# Patient Record
Sex: FEMALE | Race: WHITE | NOT HISPANIC OR LATINO
[De-identification: names, ages, dates, MRNs, and addresses within clinical notes are randomized per-mention and may not be internally consistent; named-entity substitution may affect disease eponyms.]

---

## 2018-01-10 ENCOUNTER — RESULT REVIEW (OUTPATIENT)
Age: 58
End: 2018-01-10

## 2018-12-11 ENCOUNTER — RX RENEWAL (OUTPATIENT)
Age: 58
End: 2018-12-11

## 2018-12-11 DIAGNOSIS — L90.0 LICHEN SCLEROSUS ET ATROPHICUS: ICD-10-CM

## 2018-12-11 RX ORDER — METRONIDAZOLE 7.5 MG/G
0.75 GEL VAGINAL
Qty: 1 | Refills: 0 | Status: ACTIVE | COMMUNITY
Start: 2018-12-11

## 2019-01-10 ENCOUNTER — RECORD ABSTRACTING (OUTPATIENT)
Age: 59
End: 2019-01-10

## 2019-01-10 DIAGNOSIS — Z80.3 FAMILY HISTORY OF MALIGNANT NEOPLASM OF BREAST: ICD-10-CM

## 2019-01-10 DIAGNOSIS — Z82.49 FAMILY HISTORY OF ISCHEMIC HEART DISEASE AND OTHER DISEASES OF THE CIRCULATORY SYSTEM: ICD-10-CM

## 2019-01-10 DIAGNOSIS — Z80.8 FAMILY HISTORY OF MALIGNANT NEOPLASM OF OTHER ORGANS OR SYSTEMS: ICD-10-CM

## 2019-01-10 DIAGNOSIS — Z86.39 PERSONAL HISTORY OF OTHER ENDOCRINE, NUTRITIONAL AND METABOLIC DISEASE: ICD-10-CM

## 2019-01-10 LAB — CYTOLOGY CVX/VAG DOC THIN PREP: NORMAL

## 2019-01-10 RX ORDER — PNV NO.95/FERROUS FUM/FOLIC AC 28MG-0.8MG
TABLET ORAL
Refills: 0 | Status: ACTIVE | COMMUNITY

## 2019-01-10 RX ORDER — PSYLLIUM HUSK 0.4 G
CAPSULE ORAL
Refills: 0 | Status: ACTIVE | COMMUNITY

## 2019-01-10 RX ORDER — METRONIDAZOLE 7.5 MG/G
GEL VAGINAL
Refills: 0 | Status: ACTIVE | COMMUNITY

## 2019-01-11 ENCOUNTER — APPOINTMENT (OUTPATIENT)
Dept: OBGYN | Facility: CLINIC | Age: 59
End: 2019-01-11
Payer: COMMERCIAL

## 2019-01-11 VITALS
WEIGHT: 172 LBS | SYSTOLIC BLOOD PRESSURE: 120 MMHG | HEIGHT: 72 IN | BODY MASS INDEX: 23.3 KG/M2 | DIASTOLIC BLOOD PRESSURE: 70 MMHG

## 2019-01-11 DIAGNOSIS — Z12.31 ENCOUNTER FOR SCREENING MAMMOGRAM FOR MALIGNANT NEOPLASM OF BREAST: ICD-10-CM

## 2019-01-11 DIAGNOSIS — R92.2 INCONCLUSIVE MAMMOGRAM: ICD-10-CM

## 2019-01-11 PROCEDURE — 99396 PREV VISIT EST AGE 40-64: CPT

## 2019-01-14 LAB
CANDIDA VAG CYTO: NOT DETECTED
G VAGINALIS+PREV SP MTYP VAG QL MICRO: NOT DETECTED
HPV HIGH+LOW RISK DNA PNL CVX: NOT DETECTED
T VAGINALIS VAG QL WET PREP: NOT DETECTED

## 2019-01-24 LAB — CYTOLOGY CVX/VAG DOC THIN PREP: NORMAL

## 2019-01-24 NOTE — HISTORY OF PRESENT ILLNESS
[Good] : being in good health [Healthy Diet] : a healthy diet [Regular Exercise] : regular exercise [Last Bone Density ___] : Last bone density studies [unfilled] [Last Colonoscopy ___] : Last colonoscopy [unfilled] [Last Pap ___] : Last cervical pap smear was [unfilled] [Last Colposcopy ___] : last colposcopy done [unfilled] [Last Mammogram ___] : last mammogram done [unfilled] [Perimenopausal] : is perimenopausal [Menstrual Problems] : reports abnormal menses [Definite ___ (Date)] : the last menstrual period was [unfilled] [Irregular Cycle Intervals] : are  irregular [Amenorrhea] : amenorrhea [de-identified] : HPV positive since 2015; ASCUS 2016 [de-identified] : Breast USG 1/2018: Benign BIRADS 1 [Hot Flashes] : no hot flashes [Night Sweats] : no night sweats [Vaginal Itching] : no vaginal itching [Dyspareunia] : no dyspareunia [Mood Changes] : no mood changes

## 2019-06-21 DIAGNOSIS — N63.0 UNSPECIFIED LUMP IN UNSPECIFIED BREAST: ICD-10-CM

## 2020-11-06 ENCOUNTER — APPOINTMENT (OUTPATIENT)
Dept: OBGYN | Facility: CLINIC | Age: 60
End: 2020-11-06
Payer: COMMERCIAL

## 2020-11-06 VITALS
HEIGHT: 72 IN | TEMPERATURE: 97.6 F | SYSTOLIC BLOOD PRESSURE: 120 MMHG | BODY MASS INDEX: 22.35 KG/M2 | DIASTOLIC BLOOD PRESSURE: 70 MMHG | WEIGHT: 165 LBS

## 2020-11-06 DIAGNOSIS — Z78.0 ASYMPTOMATIC MENOPAUSAL STATE: ICD-10-CM

## 2020-11-06 DIAGNOSIS — Z11.51 ENCOUNTER FOR SCREENING FOR HUMAN PAPILLOMAVIRUS (HPV): ICD-10-CM

## 2020-11-06 PROCEDURE — 99072 ADDL SUPL MATRL&STAF TM PHE: CPT

## 2020-11-06 PROCEDURE — 99396 PREV VISIT EST AGE 40-64: CPT

## 2020-11-12 DIAGNOSIS — N76.0 ACUTE VAGINITIS: ICD-10-CM

## 2020-11-12 DIAGNOSIS — B96.89 ACUTE VAGINITIS: ICD-10-CM

## 2020-11-13 PROBLEM — Z11.51 SCREENING FOR HPV (HUMAN PAPILLOMAVIRUS): Status: ACTIVE | Noted: 2020-11-13

## 2020-11-13 NOTE — HISTORY OF PRESENT ILLNESS
[FreeTextEntry1] : 59 y o P2 female presents for routine annual GYN care. Her last annual GYN visit was in 1/2019 and pap smear at that time was benign HPV negative.\par She states she is well and overall in good health. She denies current GYN complaints and reports normal bowel and bladder function.\par In 2016 she was diagnosed with mild cervical dysplasia however recent pap smears have been normal. She has a history of lichen sclerosus in the perineal region for which she uses Clobetasol ointment with good effect.\par Breast imaging in 6/2019 showed R breast abnormality which required biopsy which was normal.\par Colonoscopy in 2013 was normal

## 2020-11-18 LAB
CYTOLOGY CVX/VAG DOC THIN PREP: ABNORMAL
HPV HIGH+LOW RISK DNA PNL CVX: NOT DETECTED

## 2020-11-18 RX ORDER — METRONIDAZOLE 7.5 MG/G
0.75 GEL VAGINAL
Qty: 1 | Refills: 0 | Status: ACTIVE | COMMUNITY
Start: 2020-11-12

## 2020-11-18 RX ORDER — CLOBETASOL PROPIONATE 0.5 MG/G
0.05 OINTMENT TOPICAL TWICE DAILY
Qty: 1 | Refills: 6 | Status: ACTIVE | COMMUNITY
Start: 2018-12-11

## 2020-12-21 PROBLEM — Z12.31 ENCOUNTER FOR SCREENING MAMMOGRAM FOR BREAST CANCER: Status: RESOLVED | Noted: 2019-01-11 | Resolved: 2020-12-21

## 2020-12-23 PROBLEM — N76.0 BACTERIAL VAGINITIS: Status: RESOLVED | Noted: 2020-11-12 | Resolved: 2020-12-23

## 2022-02-17 ENCOUNTER — APPOINTMENT (OUTPATIENT)
Dept: OBGYN | Facility: CLINIC | Age: 62
End: 2022-02-17
Payer: COMMERCIAL

## 2022-02-17 ENCOUNTER — NON-APPOINTMENT (OUTPATIENT)
Age: 62
End: 2022-02-17

## 2022-02-17 VITALS
BODY MASS INDEX: 22.21 KG/M2 | HEIGHT: 72 IN | SYSTOLIC BLOOD PRESSURE: 114 MMHG | DIASTOLIC BLOOD PRESSURE: 70 MMHG | WEIGHT: 164 LBS

## 2022-02-17 DIAGNOSIS — Z00.00 ENCOUNTER FOR GENERAL ADULT MEDICAL EXAMINATION W/OUT ABNORMAL FINDINGS: ICD-10-CM

## 2022-02-17 DIAGNOSIS — Z01.419 ENCOUNTER FOR GYNECOLOGICAL EXAMINATION (GENERAL) (ROUTINE) W/OUT ABNORMAL FINDINGS: ICD-10-CM

## 2022-02-17 PROCEDURE — 99396 PREV VISIT EST AGE 40-64: CPT

## 2022-02-17 NOTE — HISTORY OF PRESENT ILLNESS
[Patient reported colonoscopy was normal] : Patient reported colonoscopy was normal [FreeTextEntry1] : 62yo  postmenopausal without HRT here for annual Gyn exam. Has had no staining or bleeding. Will be moving to [place]( building a house in [facility]). Has h/o LGSIL and + HPV. [Mammogramdate] : 12/3/20 [TextBox_19] : Levon KHAN 2D [BreastSonogramDate] : 12/3/20 [TextBox_25] : Levon KHAN 2 [PapSmeardate] : 11/6/20 [TextBox_31] : Neg/HPV Neg [BoneDensityDate] : 12/3/20 [TextBox_37] : T Score Spine -0.1 Hip -0.2 Fem Neck -0.5 [ColonoscopyDate] : 2013

## 2022-02-18 LAB — HPV HIGH+LOW RISK DNA PNL CVX: NOT DETECTED

## 2022-02-28 LAB — CYTOLOGY CVX/VAG DOC THIN PREP: ABNORMAL

## 2022-03-04 ENCOUNTER — TRANSCRIPTION ENCOUNTER (OUTPATIENT)
Age: 62
End: 2022-03-04